# Patient Record
Sex: FEMALE | Race: WHITE | HISPANIC OR LATINO | URBAN - METROPOLITAN AREA
[De-identification: names, ages, dates, MRNs, and addresses within clinical notes are randomized per-mention and may not be internally consistent; named-entity substitution may affect disease eponyms.]

---

## 2017-03-22 ENCOUNTER — FOLLOW UP ESTABLISHED (OUTPATIENT)
Dept: URBAN - METROPOLITAN AREA CLINIC 44 | Facility: CLINIC | Age: 72
End: 2017-03-22
Payer: COMMERCIAL

## 2017-03-22 DIAGNOSIS — C69.01 MALIGNANT NEOPLASM OF RIGHT CONJUNCTIVA: Primary | ICD-10-CM

## 2017-03-22 PROCEDURE — 92012 INTRM OPH EXAM EST PATIENT: CPT | Performed by: OPHTHALMOLOGY

## 2017-03-22 ASSESSMENT — INTRAOCULAR PRESSURE
OS: 11
OD: 13

## 2022-10-12 ENCOUNTER — OFFICE VISIT (OUTPATIENT)
Dept: URBAN - METROPOLITAN AREA CLINIC 44 | Facility: CLINIC | Age: 77
End: 2022-10-12
Payer: COMMERCIAL

## 2022-10-12 DIAGNOSIS — C69.01 MALIGNANT NEOPLASM OF RIGHT CONJUNCTIVA: ICD-10-CM

## 2022-10-12 DIAGNOSIS — Z79.4 LONG TERM (CURRENT) USE OF INSULIN: ICD-10-CM

## 2022-10-12 DIAGNOSIS — E11.9 TYPE 2 DIABETES MELLITUS WITHOUT COMPLICATIONS: ICD-10-CM

## 2022-10-12 DIAGNOSIS — D23.10 OTHER BENIGN NEOPLASM OF SKIN OF EYELID INCLUDING CANTHUS: Primary | ICD-10-CM

## 2022-10-12 PROCEDURE — 99204 OFFICE O/P NEW MOD 45 MIN: CPT | Performed by: OPTOMETRIST

## 2022-10-12 ASSESSMENT — VISUAL ACUITY
OD: 20/30
OS: 20/40

## 2022-10-12 ASSESSMENT — KERATOMETRY
OS: 46.88
OD: 47.13

## 2022-10-12 ASSESSMENT — INTRAOCULAR PRESSURE
OD: 15
OS: 14

## 2022-10-12 NOTE — IMPRESSION/PLAN
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 Plan: Patient has noticed bump on RUL x 1 month. No lesion palpated or observed today. Recommend warm compresses x 1 month or until lesion resolves. Monitor PRN.

## 2022-10-12 NOTE — IMPRESSION/PLAN
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 s.p 5FU. No change on exam today. Appears to look benign. Will recommend follow up with repeat exam with cornea in 6 months to make sure no changes.